# Patient Record
Sex: MALE | Race: BLACK OR AFRICAN AMERICAN | NOT HISPANIC OR LATINO | URBAN - METROPOLITAN AREA
[De-identification: names, ages, dates, MRNs, and addresses within clinical notes are randomized per-mention and may not be internally consistent; named-entity substitution may affect disease eponyms.]

---

## 2024-01-04 ENCOUNTER — OFFICE VISIT (OUTPATIENT)
Dept: ORTHOPEDIC SURGERY | Facility: CLINIC | Age: 64
End: 2024-01-04
Payer: COMMERCIAL

## 2024-01-04 ENCOUNTER — ANCILLARY PROCEDURE (OUTPATIENT)
Dept: RADIOLOGY | Facility: CLINIC | Age: 64
End: 2024-01-04
Payer: COMMERCIAL

## 2024-01-04 VITALS — HEIGHT: 69 IN | BODY MASS INDEX: 27.4 KG/M2 | WEIGHT: 185 LBS

## 2024-01-04 DIAGNOSIS — S97.82XS CRUSH INJURY OF FOOT, LEFT, SEQUELA: Primary | ICD-10-CM

## 2024-01-04 DIAGNOSIS — M79.672 LEFT FOOT PAIN: ICD-10-CM

## 2024-01-04 PROCEDURE — G0463 HOSPITAL OUTPT CLINIC VISIT: HCPCS | Performed by: ORTHOPAEDIC SURGERY

## 2024-01-04 PROCEDURE — 73630 X-RAY EXAM OF FOOT: CPT | Mod: LEFT SIDE | Performed by: RADIOLOGY

## 2024-01-04 PROCEDURE — 73630 X-RAY EXAM OF FOOT: CPT | Mod: LT

## 2024-01-04 PROCEDURE — 99214 OFFICE O/P EST MOD 30 MIN: CPT | Performed by: ORTHOPAEDIC SURGERY

## 2024-01-04 PROCEDURE — 99204 OFFICE O/P NEW MOD 45 MIN: CPT | Performed by: ORTHOPAEDIC SURGERY

## 2024-01-04 ASSESSMENT — PAIN - FUNCTIONAL ASSESSMENT: PAIN_FUNCTIONAL_ASSESSMENT: 0-10

## 2024-01-04 ASSESSMENT — PAIN SCALES - GENERAL: PAINLEVEL_OUTOF10: 8

## 2024-01-04 ASSESSMENT — PAIN DESCRIPTION - DESCRIPTORS: DESCRIPTORS: ACHING;SHARP;SHOOTING;THROBBING

## 2024-01-04 NOTE — PROGRESS NOTES
History of Present Illness  Is a pleasant 63-year-old male presents today for initial evaluation of left foot pain has been ongoing since a work-related crush injury back in 2020 to his left foot.  Patient reports that he has sustained multiple fractures in his left foot with a lateral wound that was managed conservatively with a cast over the [facility] at the time.  Never had surgery on the foot.  Since then has been having chronic pain to the left foot which has been managed conservatively with therapy.  Endorses most of his pain is along the lateral aspect of the foot however does have some radiating pains down into the foot diffusely as well.  This is a Workmen's Comp. case.    Smoking  Yes    BMI  Current BMI is 27    Pain is described as aching    Location: lateral  Pain level: 7  Assistive device: is using a cane  History of surgery: No       Review of Systems   GENERAL: Negative for malaise, significant weight loss, fever  MUSCULOSKELETAL: see HPI  NEURO:  Negative       On exam:  WD/WN  A+O X3  NAD  No lymphedema  Inspection of both feet and ankles show symmetric arches.   4/5 eversion strength  Sensation intact to LT.   Good pulses.   Stable anterior drawer.  No peroneal subluxation.  TTP over peroneal tendons, exacerbated also with resisted eversion     I personally reviewed the following radiographic exams: No acute fractures or dislocations of the left foot  X-rays of the left foot obtained today, 1/4/2024    Assessment: Chronic left foot pain, possible peroneal tendinopathy    Plan: Discussed nonoperative and operative options in detail.   Risk and benefits discussed in detail. All questions answered today.  Recovery timeline and expectations discussed in detail.  Patient is here with chronic left foot pain following a work-related injury back in 2020.  At this point, any previous fractures appear to have healed on most recent x-rays.  Alignment of the foot appears to be largely normal.   Patient's clinical examination is significant for possible peroneal tendinopathy.  Will obtain an MRI to evaluate for this or any soft tissue derangement.  Recommend continued work with therapy and possible referral to pain management.

## 2024-01-04 NOTE — PROGRESS NOTES
Patient was reviewed and discussed with ANA M and/or orthopedic resident.  Patient was seen and evaluated in conjunction with ANA M and/or orthopedic resident. Findings and treatment plan were discussed and approved by myself, Dr. Ragland.    Diffuse foot pain.  No apparent objective signs consistent with surgical problem.  Likely related to soft tissue injury from a crush injury.  Agree with pain management referral.  Can consider advanced imaging with MRI but do not feel there is anything that will improve this with surgery.

## 2024-01-15 ENCOUNTER — APPOINTMENT (OUTPATIENT)
Dept: PAIN MEDICINE | Facility: HOSPITAL | Age: 64
End: 2024-01-15
Payer: COMMERCIAL

## 2024-01-24 ENCOUNTER — APPOINTMENT (OUTPATIENT)
Dept: PAIN MEDICINE | Facility: HOSPITAL | Age: 64
End: 2024-01-24
Payer: COMMERCIAL

## 2024-02-05 ENCOUNTER — OFFICE VISIT (OUTPATIENT)
Dept: PAIN MEDICINE | Facility: HOSPITAL | Age: 64
End: 2024-02-05
Payer: COMMERCIAL

## 2024-02-05 DIAGNOSIS — G89.29 CHRONIC FOOT PAIN, LEFT: Primary | ICD-10-CM

## 2024-02-05 DIAGNOSIS — M54.16 LUMBAR RADICULOPATHY: ICD-10-CM

## 2024-02-05 DIAGNOSIS — M79.672 CHRONIC FOOT PAIN, LEFT: Primary | ICD-10-CM

## 2024-02-05 PROCEDURE — 99204 OFFICE O/P NEW MOD 45 MIN: CPT | Performed by: ANESTHESIOLOGY

## 2024-02-05 PROCEDURE — 99214 OFFICE O/P EST MOD 30 MIN: CPT | Performed by: ANESTHESIOLOGY

## 2024-02-05 PROCEDURE — G0463 HOSPITAL OUTPT CLINIC VISIT: HCPCS | Performed by: ANESTHESIOLOGY

## 2024-02-05 RX ORDER — AMITRIPTYLINE HYDROCHLORIDE 10 MG/1
10 TABLET, FILM COATED ORAL NIGHTLY
Qty: 30 TABLET | Refills: 3 | Status: SHIPPED | OUTPATIENT
Start: 2024-02-05 | End: 2024-04-29

## 2024-02-05 ASSESSMENT — PAIN SCALES - GENERAL: PAINLEVEL: 7

## 2024-02-05 NOTE — PROGRESS NOTES
Subjective   Patient ID: Tom Oliver is a 63 y.o. male with a past medical history of left foot crush injury 2020 with resultant chronic left foot pain.  Patient states that he had many fractures and an open wound on his left foot which have been treated but now he is left with chronic left foot pain.  He states this pain is constant, severe, described as burning accompanied by color changes with bluish appearance in the evening, allodynia, and sensations of hot and cold temperature changes.  He denies any swelling with this.  Does note that occasionally he has a tremor in this foot along with weakness.  The pain is most significant in the left lateral foot but does occasionally shoot up into the left anterior inferior shin.  This is a workers comp issue and he has not been able to work since the injury in 2020.  He has tried tramadol in the past for pain off but denies other medication for pain    Physical Therapy: The patient has not done physical therapy within the past six months    Classes of medications tried in the past: Acetaminophen, NSAIDs, and tramadol in the past    Review of Systems   13-point ROS done and negative except for HPI.     No current outpatient medications    Past Medical History:   Diagnosis Date    Presence of coronary angioplasty implant and graft     Stented coronary artery        Past Surgical History:   Procedure Laterality Date    HERNIA REPAIR  06/05/2017    Hernia Repair        No family history on file.     No Known Allergies     Objective     There were no vitals filed for this visit.     Physical Exam  General: NAD, well groomed, well nourished  Eyes: Non-icteric sclera, EOMI  Ears, Nose, Mouth, and Throat: External ears and nose appear to be without deformity or rash. No lesions or masses noted. Hearing is grossly intact.   Neck: Trachea midline  Respiratory: Nonlabored breathing   Cardiovascular: no peripheral edema   Skin: No rashes or open lesions/ulcers identified on skin.   Foot exam as below.  Extremity:   Left foot thickened calluses on plantar aspect under the great toe and fifth metatarsal phalangeal joint.  There is a large scar on the lateral left foot.  Patient does describe allodynia with palpation.  The skin is somewhat thickened on the left foot in the setting of poor hygiene    Neurologic:   Cranial nerves grossly intact.   Strength 3/5 strength left foot dorsiflexion and plantarflexion  Sensation: Hyperalgesia to light palpation and dysesthesia to light touch    Psychiatric: Alert, orientation to person, place, and time. Cooperative.    Imaging personally reviewed and independently interpreted: X-ray left foot 1/2/24 which shows calcaneal bone spur but no acute bony abnormality in the area of pain    Assessment/Plan   Tom Oliver is a 63 y.o. male with hx of left foot crush injury 2020 with resultant chronic left foot pain described as constant, severe, with allodynia and skin color changes and hot/cold temperature changes.  He denies any swelling with this.  Does note that occasionally he has a tremor in this foot along with weakness.  This is a workers comp issue issue and he has not been able to work since the injury in 2020.  He has tried tramadol in the past for pain off but denies other medication for pain. Pt referred by Dr. Monte.  Exam and history concerning for CRPS, however, this diagnosis is only possible if there is no other more likely diagnosis.    Plan:  -Per Dr. Ragland's note, patient was recommended to have a foot MRI which has not been completed.  Patient encouraged to follow-up with Dr. Ragland for further evaluation including MRI.  If MRI is negative, patient likely has CRPS type one of the left foot  -We'll start the patient amitriptyline 10 mg at bedtime. Side effect profile of this medication was discussed with the patient.      The patient was invited to contact us back anytime with any questions or concerns and follow-up with us in the office  as needed.     Diagnoses and all orders for this visit:  Chronic foot pain, left  Lumbar radiculopathy  Concern for possible CRPS type 1    This note was generated with the aid of dictation software, there may be typos despite my attempts at proofreading.

## 2024-04-27 DIAGNOSIS — M54.16 LUMBAR RADICULOPATHY: ICD-10-CM

## 2024-04-29 RX ORDER — AMITRIPTYLINE HYDROCHLORIDE 10 MG/1
10 TABLET, FILM COATED ORAL NIGHTLY
Qty: 30 TABLET | Refills: 3 | Status: SHIPPED | OUTPATIENT
Start: 2024-04-29